# Patient Record
Sex: MALE | Race: WHITE
[De-identification: names, ages, dates, MRNs, and addresses within clinical notes are randomized per-mention and may not be internally consistent; named-entity substitution may affect disease eponyms.]

---

## 2020-10-13 ENCOUNTER — HOSPITAL ENCOUNTER (EMERGENCY)
Dept: HOSPITAL 50 - VM.ED | Age: 55
Discharge: HOME | End: 2020-10-13
Payer: COMMERCIAL

## 2020-10-13 DIAGNOSIS — Z79.899: ICD-10-CM

## 2020-10-13 DIAGNOSIS — R20.2: ICD-10-CM

## 2020-10-13 DIAGNOSIS — R07.89: Primary | ICD-10-CM

## 2020-10-13 LAB
ANION GAP SERPL CALC-SCNC: 11.6 MMOL/L (ref 10–20)
CHLORIDE SERPL-SCNC: 103 MMOL/L (ref 98–107)
SODIUM SERPL-SCNC: 139 MMOL/L (ref 136–145)

## 2020-10-13 NOTE — EDM.PDOC
ED HPI GENERAL MEDICAL PROBLEM





- General


Stated Complaint: TINGLING DOWN L ARM;CHEST TIGHTNESS


Time Seen by Provider: 10/13/20 15:15


Source of Information: Reports: Patient


History Limitations: Reports: No Limitations





- History of Present Illness


INITIAL COMMENTS - FREE TEXT/NARRATIVE: 





Patient comes emergency department today with complaints of left chest wall pain

and some tingling down his arm.  This patient relates for the past 3 to 4 days 

he has had point tenderness pain between 2 ribs on the left anterior 

midclavicular line of his chest.  The pain gets worse with deep breath cough and

movement or palpation.  He has no midsternal chest pain.  No shortness of 

breath.  No diaphoresis.  No weakness dizziness lightheadedness.  No 

palpitations.  Today after laying his arm on a desk differently than he normally

does he had a little tingling in his left arm which is resolved prior to 

arrival.  He denies any other paresthesias.  No shortness of breath cough or 

congestion.  No fever no chills.  No abdominal pain nausea or vomiting.  No 

hematuria dysuria or urinary frequency.  No black or tarry stools.  No headache 

visual disturbances.  No paresthesias no COVID exposure NO COVID symptoms. 





- Related Data


                                    Allergies











Allergy/AdvReac Type Severity Reaction Status Date / Time


 


No Known Drug Allergies Allergy  Other Verified 10/13/20 15:40











Home Meds: 


                                    Home Meds





hydroCHLOROthiazide [Hydrochlorothiazide] 1 tab PO DAILY 09/05/14 [History]











Social & Family History





- Living Situation & Occupation


Living situation: Reports: 


Occupation: Employed





ED ROS GENERAL





- Review of Systems


Review Of Systems: Comprehensive ROS is negative, except as noted in HPI.





ED EXAM, GENERAL





- Physical Exam


Exam: See Below


Exam Limited By: No Limitations


General Appearance: Alert, WD/WN, No Apparent Distress


Ears: Normal External Exam


Nose: Normal Inspection, Normal Mucosa


Throat/Mouth: Normal Inspection, Normal Lips, Normal Teeth, Normal Oropharynx, 

Normal Voice


Head: Atraumatic, Normocephalic


Neck: Normal Inspection, Supple, Non-Tender


Respiratory/Chest: No Respiratory Distress, Lungs Clear, Normal Breath Sounds, 

No Accessory Muscle Use, Chest Non-Tender (Just between ribs approximately 5 and

 6 on the anterior midclavicular line there is some point tenderness.  There is 

no bruising swelling ecchymosis.  Flail segments.  Subcutaneous emphysema in the

 rest of the chest is unremarkable as well.)


Cardiovascular: Normal Peripheral Pulses, Regular Rate, Rhythm


GI/Abdominal: Normal Bowel Sounds, Soft, Non-Tender


 (Male) Exam: Deferred


Rectal (Males) Exam: Deferred


Back Exam: Normal Inspection, Full Range of Motion


Extremities: Normal Inspection, Normal Range of Motion, No Pedal Edema, Normal 

Capillary Refill


Neurological: Alert, Oriented, Normal Cognition, Normal Gait, No Motor/Sensory 

Deficits


Psychiatric: Normal Affect, Normal Mood


Skin Exam: Warm, Dry, Intact, Normal Color, No Rash


  ** #1 Interpretation


EKG Date: 10/13/20


Time: 15:04


Rhythm: NSR


Rate (Beats/Min): 78


Axis: Normal


P-Wave: Present


QRS: Normal


ST-T: Normal


QT: Normal


Comparison: No Change





Course





- Orders/Labs/Meds


Orders: 


                               Active Orders 24 hr











 Category Date Time Status


 


 EKG Documentation Completion [RC] STAT Care  10/13/20 15:20 Active











Labs: 


                                Laboratory Tests











  10/13/20 10/13/20 Range/Units





  15:30 15:30 


 


WBC  7.8   (4.0-10.0)  x10^3/uL


 


RBC  4.77   (4.5-6.0)  x10^6/uL


 


Hgb  15.0   (14.0-18.0)  g/dL


 


Hct  42.7   (40.0-52.0)  %


 


MCV  89.5   (78.0-93.0)  fL


 


MCH  31.4   (26.0-32.0)  pg


 


MCHC  35.1   (32.0-36.0)  g/dL


 


RDW Coeff of Yesi  13.3   (10.0-15.0)  %


 


Plt Count  268   (130-400)  x10^3/uL


 


Neut % (Auto)  65.4   (50.0-80.0)  %


 


Lymph % (Auto)  23.1 L   (25.0-50.0)  %


 


Mono % (Auto)  9.6   (2.0-11.0)  %


 


Eos % (Auto)  1.5   (0.0-4.0)  %


 


Baso % (Auto)  0.4   (0.2-1.2)  %


 


Sodium   139  (136-145)  mmol/L


 


Potassium   3.6  (3.5-5.1)  mmol/L


 


Chloride   103  ()  mmol/L


 


Carbon Dioxide   28  (21-32)  mmol/L


 


Anion Gap   11.6  (10-20)  mmol/L


 


BUN   16  (7-18)  mg/dL


 


Creatinine   1.1  (0.70-1.30)  mg/dL


 


Est Cr Clr Drug Dosing   TNP  


 


Estimated GFR (MDRD)   > 60  


 


Glucose   107 H  ()  mg/dL


 


Calcium   9.6  (8.5-10.1)  mg/dL


 


Corrected Calcium   9.60  (8.5-10.1)  mg/dL


 


Total Bilirubin   0.9  (0.2-1.0)  mg/dL


 


AST   35  (15-37)  U/L


 


ALT   54  (16-63)  U/L


 


Alkaline Phosphatase   49  ()  U/L


 


Troponin I   < 0.017  (<=0.056)  ng/mL


 


Total Protein   7.4  (6.4-8.2)  g/dL


 


Albumin   4.0  (3.4-5.0)  g/dL


 


Globulin   3.4  


 


Albumin/Globulin Ratio   1.18  














- Re-Assessments/Exams


Free Text/Narrative Re-Assessment/Exam: 





10/13/20 18:30


EKG is normal.





X-ray is normal.


Laboratory evaluation is unremarkable.  Troponin is negative with his 

intermittent sharp shooting stabbing chest wall pain for the last 3 to 4 days.








Is really the sequelae of chest wall pain are a pulled muscle in the chest.  His

 paresthesias only lasted for a short period of time that resolved on his own.  

Tylenol ibuprofen for the chest wall pain.  Recheck if anything new or worse.  

He is comfortable with this plan his questions are answered.





Departure





- Departure


Time of Disposition: 16:18


Disposition: Home, Self-Care 01


Clinical Impression: 


 Chest wall pain





Instructions:  Chest Wall Pain, Easy-to-Read


Referrals: 


Otis Urban MD [Primary Care Provider] - 


Additional Instructions: 


Tylenol and or Ibuprofen as needed for pain. 


Ice or Heat to the affected area. 


Return to the ED if new or worsening symptoms. 


Follow up with PCP in the next 4-6 days if not improving sooner if worse. 





- My Orders


Last 24 Hours: 


My Active Orders





10/13/20 15:20


EKG Documentation Completion [RC] STAT 














- Assessment/Plan


Last 24 Hours: 


My Active Orders





10/13/20 15:20


EKG Documentation Completion [RC] STAT

## 2020-10-13 NOTE — CR
______________________________________________________________________________   

  

[id number] RAD/RAD Chest PA And Lateral  

EXAM:  RAD Chest PA And Lateral  

   

 INDICATION:  CHEST PAIN.  

   

 COMPARISON:  None.  

   

 DISCUSSION:    

   

 Cardiomediastinal silhouette is normal in size and contour.  

   

 Lungs are clear. No pleural effusion or pneumothorax.  

   

 Spinal fusion hardware extends beyond the field of view of this examination  

 inferiorly.  

   

 IMPRESSION:  

 Negative examination of the chest.  

   

 Electronically signed by Kofi Keys MD on 10/13/2020 3:39 PM  

   

  

Kofi Keys MD                 

 10/13/20 1547    

  

Thank you for allowing us to participate in the care of your patient.